# Patient Record
Sex: FEMALE | Race: BLACK OR AFRICAN AMERICAN | NOT HISPANIC OR LATINO | Employment: FULL TIME | URBAN - METROPOLITAN AREA
[De-identification: names, ages, dates, MRNs, and addresses within clinical notes are randomized per-mention and may not be internally consistent; named-entity substitution may affect disease eponyms.]

---

## 2023-10-31 PROBLEM — L74.510 PRIMARY FOCAL HYPERHIDROSIS, AXILLA: Status: ACTIVE | Noted: 2023-07-28

## 2023-10-31 PROBLEM — L91.0 KELOID: Status: ACTIVE | Noted: 2023-07-28

## 2023-10-31 PROBLEM — L42 PITYRIASIS ROSEA: Status: ACTIVE | Noted: 2023-07-28

## 2023-10-31 PROBLEM — R21 RASH AND OTHER NONSPECIFIC SKIN ERUPTION: Status: ACTIVE | Noted: 2023-07-28

## 2023-10-31 PROBLEM — L70.0 ACNE VULGARIS: Status: ACTIVE | Noted: 2023-07-28

## 2023-10-31 PROBLEM — L91.0 HYPERTROPHIC SCAR: Status: ACTIVE | Noted: 2023-07-28

## 2023-10-31 RX ORDER — MECLIZINE HYDROCHLORIDE 25 MG/1
25 TABLET ORAL 3 TIMES DAILY PRN
COMMUNITY
Start: 2014-10-18

## 2023-10-31 RX ORDER — ALUMINUM CHLORIDE 20 %
SOLUTION, NON-ORAL TOPICAL
COMMUNITY
Start: 2021-09-03

## 2023-10-31 RX ORDER — BENZOYL PEROXIDE 100 MG/ML
LIQUID TOPICAL
COMMUNITY
Start: 2022-05-12

## 2023-10-31 RX ORDER — TRIAMCINOLONE ACETONIDE 1 MG/G
CREAM TOPICAL
COMMUNITY
Start: 2017-10-02 | End: 2023-12-27 | Stop reason: WASHOUT

## 2023-10-31 RX ORDER — CLINDAMYCIN PHOSPHATE 10 UG/ML
LOTION TOPICAL
COMMUNITY
Start: 2020-11-11

## 2023-10-31 RX ORDER — TRETINOIN 0.5 MG/G
CREAM TOPICAL
COMMUNITY
Start: 2022-06-09 | End: 2023-12-27 | Stop reason: WASHOUT

## 2023-10-31 RX ORDER — TRETINOIN 0.25 MG/G
CREAM TOPICAL
COMMUNITY
Start: 2020-11-11 | End: 2023-12-27 | Stop reason: WASHOUT

## 2023-10-31 RX ORDER — SPIRONOLACTONE AND HYDROCHLOROTHIAZIDE 25; 25 MG/1; MG/1
TABLET ORAL
COMMUNITY
Start: 2020-11-11

## 2023-10-31 RX ORDER — HYDROQUINONE 40 MG/G
CREAM TOPICAL
COMMUNITY
Start: 2023-07-28 | End: 2023-12-27 | Stop reason: WASHOUT

## 2023-10-31 RX ORDER — DOXYCYCLINE HYCLATE 100 MG
TABLET ORAL
COMMUNITY
Start: 2023-04-28

## 2023-10-31 RX ORDER — HYDROQUINONE 40 MG/G
CREAM TOPICAL
COMMUNITY
Start: 2022-05-12 | End: 2023-12-27 | Stop reason: WASHOUT

## 2023-11-03 ENCOUNTER — OFFICE VISIT (OUTPATIENT)
Dept: DERMATOLOGY | Facility: CLINIC | Age: 28
End: 2023-11-03
Payer: MEDICAID

## 2023-11-03 DIAGNOSIS — L91.0 KELOID: Primary | ICD-10-CM

## 2023-11-03 ASSESSMENT — DERMATOLOGY QUALITY OF LIFE (QOL) ASSESSMENT
DATE THE QUALITY-OF-LIFE ASSESSMENT WAS COMPLETED: 66781
RATE HOW BOTHERED YOU ARE BY EFFECTS OF YOUR SKIN PROBLEMS ON YOUR ACTIVITIES (EG, GOING OUT, ACCOMPLISHING WHAT YOU WANT, WORK ACTIVITIES OR YOUR RELATIONSHIPS WITH OTHERS): 0 - NEVER BOTHERED
RATE HOW BOTHERED YOU ARE BY SYMPTOMS OF YOUR SKIN PROBLEM (EG, ITCHING, STINGING BURNING, HURTING OR SKIN IRRITATION): 0 - NEVER BOTHERED
RATE HOW EMOTIONALLY BOTHERED YOU ARE BY YOUR SKIN PROBLEM (FOR EXAMPLE, WORRY, EMBARRASSMENT, FRUSTRATION): 0 - NEVER BOTHERED
ARE THERE EXCLUSIONS OR EXCEPTIONS FOR THE QUALITY OF LIFE ASSESSMENT: NO

## 2023-11-03 ASSESSMENT — ITCH NUMERIC RATING SCALE: HOW SEVERE IS YOUR ITCHING?: 0

## 2023-11-03 ASSESSMENT — DERMATOLOGY PATIENT ASSESSMENT: DO YOU HAVE ANY NEW OR CHANGING LESIONS: NO

## 2023-11-03 NOTE — PROGRESS NOTES
Subjective     Danielle Wise is a 28 y.o. female who presents for the following: Keloid.     Established patient of Chika Diggs last seen September 2023 for keloid to left helix duration longstanding prior treatments IL K injection.  Patient previously injected with IL K 40 mg 0.2 mL.  Patient states she has seen improvement and would like to continue treatment today.      Review of Systems:  No other skin or systemic complaints other than what is documented elsewhere in the note.    The following portions of the chart were reviewed this encounter and updated as appropriate:       Skin Cancer History  No skin cancer on file.    Specialty Problems          Dermatology Problems    Acne vulgaris    Hypertrophic scar    Keloid    Pityriasis rosea    Primary focal hyperhidrosis, axilla    Rash and other nonspecific skin eruption     Past Medical History:  Danielle Wise  has no past medical history on file.    Past Surgical History:  Danielle Wise  has no past surgical history on file.    Family History:  Patient family history is not on file.    Social History:  Danielle Wise  has no history on file for tobacco use, alcohol use, and drug use.    Allergies:  Patient has no known allergies.    Current Medications / CAM's:    Current Outpatient Medications:     aluminum chloride (Drysol Dab-O-Matic) 20 % external solution, 0, Disp: , Rfl:     benzoyl peroxide (Benzac AC) 10 % external wash, 1 Application, Disp: , Rfl:     benzoyl peroxide 5 % lotion, 1 Application, Disp: , Rfl:     clindamycin (Cleocin T) 1 % lotion, 1 Application, Disp: , Rfl:     doxycycline (Vibra-Tabs) 100 mg tablet, 1 Tablet, Disp: , Rfl:     hydroquinone 4 % cream, 1 Application, Disp: , Rfl:     hydroquinone 4 % cream, 1 Application, Disp: , Rfl:     meclizine (Antivert) 25 mg tablet, Take 1 tablet (25 mg) by mouth 3 times a day as needed., Disp: , Rfl:     spironolacton-hydrochlorothiaz (Aldactazide) 25-25 mg tablet, 1 Tablet, Disp: , Rfl:      tretinoin (Retin-A) 0.025 % cream, 1 Application, Disp: , Rfl:     tretinoin (Retin-A) 0.05 % cream, 1 Application, Disp: , Rfl:     triamcinolone (Kenalog) 0.1 % cream, 1 Application, Disp: , Rfl:      Objective   Well appearing patient in no apparent distress; mood and affect are within normal limits.    Assessment/Plan   1. Keloid (2)  Left Ear (2)  Shiny, thick, fibrotic pink-brown plaque.    Intralesional injection - Left Ear (2)  Intralesional Injection: 0.2ml total injected  Date/Time: 11/3/2023 9:10 AM    Consent:     Consent obtained:  Verbal    Consent given by:  Patient    Risks discussed:  Poor cosmetic result, pain, infection and bleeding    Alternatives discussed:  No treatment  Pre-Procedure Details:     Prep Type:  Isopropyl alcohol  Procedure Details:   Injection:  Triamcinolone  Post-procedure details: sterile dressing applied and wound care instructions given  Dressing type: bandage

## 2023-12-13 ENCOUNTER — APPOINTMENT (OUTPATIENT)
Dept: OBSTETRICS AND GYNECOLOGY | Facility: CLINIC | Age: 28
End: 2023-12-13
Payer: MEDICAID

## 2023-12-27 ENCOUNTER — OFFICE VISIT (OUTPATIENT)
Dept: OBSTETRICS AND GYNECOLOGY | Facility: CLINIC | Age: 28
End: 2023-12-27
Payer: MEDICAID

## 2023-12-27 VITALS
BODY MASS INDEX: 30.13 KG/M2 | WEIGHT: 203.4 LBS | DIASTOLIC BLOOD PRESSURE: 84 MMHG | SYSTOLIC BLOOD PRESSURE: 129 MMHG | HEIGHT: 69 IN

## 2023-12-27 DIAGNOSIS — N94.10 DYSPAREUNIA IN FEMALE: ICD-10-CM

## 2023-12-27 DIAGNOSIS — Z01.419 ENCOUNTER FOR WELL WOMAN EXAM WITH ROUTINE GYNECOLOGICAL EXAM: Primary | ICD-10-CM

## 2023-12-27 PROCEDURE — 88175 CYTOPATH C/V AUTO FLUID REDO: CPT

## 2023-12-27 PROCEDURE — 1036F TOBACCO NON-USER: CPT | Performed by: OBSTETRICS & GYNECOLOGY

## 2023-12-27 PROCEDURE — 99213 OFFICE O/P EST LOW 20 MIN: CPT | Performed by: OBSTETRICS & GYNECOLOGY

## 2023-12-27 PROCEDURE — 99385 PREV VISIT NEW AGE 18-39: CPT | Performed by: OBSTETRICS & GYNECOLOGY

## 2023-12-27 ASSESSMENT — ENCOUNTER SYMPTOMS
CARDIOVASCULAR NEGATIVE: 0
CONSTITUTIONAL NEGATIVE: 0
GASTROINTESTINAL NEGATIVE: 0
HEMATOLOGIC/LYMPHATIC NEGATIVE: 0
MUSCULOSKELETAL NEGATIVE: 0
NEUROLOGICAL NEGATIVE: 0
PSYCHIATRIC NEGATIVE: 0
RESPIRATORY NEGATIVE: 0
ENDOCRINE NEGATIVE: 0
EYES NEGATIVE: 0
ALLERGIC/IMMUNOLOGIC NEGATIVE: 0

## 2023-12-27 ASSESSMENT — PATIENT HEALTH QUESTIONNAIRE - PHQ9
1. LITTLE INTEREST OR PLEASURE IN DOING THINGS: SEVERAL DAYS
SUM OF ALL RESPONSES TO PHQ9 QUESTIONS 1 AND 2: 1
10. IF YOU CHECKED OFF ANY PROBLEMS, HOW DIFFICULT HAVE THESE PROBLEMS MADE IT FOR YOU TO DO YOUR WORK, TAKE CARE OF THINGS AT HOME, OR GET ALONG WITH OTHER PEOPLE: SOMEWHAT DIFFICULT
2. FEELING DOWN, DEPRESSED OR HOPELESS: NOT AT ALL

## 2023-12-27 ASSESSMENT — PAIN SCALES - GENERAL: PAINLEVEL: 0-NO PAIN

## 2023-12-27 NOTE — PROGRESS NOTES
"History Of Present Illness  Routine Gyn Exam  Danielle Wise here for routine WWE.     Current complaints: . She reports painful intercourse. After her delivery she had \"two sets of stiches.\"  The pain can be variable-- at times the pain is with deeper penetration but can say that \"the pain is everywhere.\" She can have pain in her abdomen as well with activities like walking up the steps.  She reports that intercourse is never pain free.  Pain can be both with entry and deeper penetration.  She does use a lubricant to help with dryness. She also reports that, although she can achieve an orgasm independently, \"it is not the same\" and that she rarely can achieve an orgasm with her partner. Her desire/arousal is unchanged.     Gynecologic History  LMP 2023  Contraception: IUD-- placed   Last Pap: unsure. Results were: normal  Last mammogram: NA. Results were: NA  Last Colonoscopy:  NA. Results were: NA  Last DEXA: NA. Results were: NA  Lipid/DM: NA    Obstetric History  OB History    Para Term  AB Living   3 1           SAB IAB Ectopic Multiple Live Births                  # Outcome Date GA Lbr Luciano/2nd Weight Sex Delivery Anes PTL Lv   3             2             1 Para                 Past Medical History  She has no past medical history on file.    Surgical History  She has no past surgical history on file.     Social History  She reports that she has never smoked. She has never used smokeless tobacco. She reports that she does not currently use alcohol. She reports that she does not use drugs.    Family History  No family history on file.     Allergies  Latex     Physical Exam  Constitutional:       Appearance: Normal appearance.   Pulmonary:      Effort: Pulmonary effort is normal.   Abdominal:      General: Abdomen is flat. There is no distension.      Palpations: Abdomen is soft.      Tenderness: There is no abdominal tenderness.   Genitourinary:     Labia:         Right: No " "rash, tenderness or lesion.         Left: No rash, tenderness or lesion.       Urethra: No prolapse.      Vagina: Normal. No vaginal discharge or bleeding.      Cervix: No friability or lesion.      Uterus: Normal. Not enlarged and not tender.       Adnexa:         Right: No mass, tenderness or fullness.          Left: No mass, tenderness or fullness.        Comments: IUD strings present  Neurological:      General: No focal deficit present.      Mental Status: She is alert.   Psychiatric:         Mood and Affect: Mood normal.          Last Recorded Vitals  Blood pressure 129/84, height 1.753 m (5' 9\"), weight 92.3 kg (203 lb 6.4 oz).    Assessment/Plan   Problem List Items Addressed This Visit    None  Visit Diagnoses       Encounter for well woman exam with routine gynecological exam    -  Primary    Relevant Orders    THINPREP PAP TEST    Dyspareunia in female        Relevant Orders    US PELVIS TRANSABDOMINAL WITH TRANSVAGINAL            Contraception: Paragard  PAP: obtained   Mammogram: NA   Colonoscopy:  NA   DEXA: NA   Lipid/DM:  NA  We spent an additional 20 minutes reviewing her sexuality-based complaints of dyspareunia and difficulty achieving orgasm.  Will obtain ultrasound. She does not seem to have significant pelvic floor dysfunction on exam-- discussed possible pelvic floor PT and/or Behavioral Health referral pending ultrasound results.     Ari Valero MD  "

## 2024-01-05 ENCOUNTER — HOSPITAL ENCOUNTER (OUTPATIENT)
Dept: RADIOLOGY | Facility: HOSPITAL | Age: 29
Discharge: HOME | End: 2024-01-05
Payer: MEDICAID

## 2024-01-05 DIAGNOSIS — N94.10 DYSPAREUNIA IN FEMALE: ICD-10-CM

## 2024-01-05 PROCEDURE — 76856 US EXAM PELVIC COMPLETE: CPT | Performed by: RADIOLOGY

## 2024-01-05 PROCEDURE — 76830 TRANSVAGINAL US NON-OB: CPT

## 2024-01-05 PROCEDURE — 76830 TRANSVAGINAL US NON-OB: CPT | Performed by: RADIOLOGY

## 2024-01-08 DIAGNOSIS — N94.10 DYSPAREUNIA IN FEMALE: Primary | ICD-10-CM

## 2024-01-12 LAB
CYTOLOGY CMNT CVX/VAG CYTO-IMP: NORMAL
LAB AP HPV GENOTYPE QUESTION: YES
LAB AP HPV HR: NORMAL
LABORATORY COMMENT REPORT: NORMAL
PATH REPORT.TOTAL CANCER: NORMAL

## 2024-01-26 ENCOUNTER — APPOINTMENT (OUTPATIENT)
Dept: DERMATOLOGY | Facility: CLINIC | Age: 29
End: 2024-01-26
Payer: MEDICAID

## 2024-02-02 ENCOUNTER — APPOINTMENT (OUTPATIENT)
Dept: DERMATOLOGY | Facility: CLINIC | Age: 29
End: 2024-02-02
Payer: MEDICAID

## 2024-04-26 ENCOUNTER — LAB (OUTPATIENT)
Dept: LAB | Facility: LAB | Age: 29
End: 2024-04-26
Payer: MEDICAID

## 2024-04-26 ENCOUNTER — OFFICE VISIT (OUTPATIENT)
Dept: DERMATOLOGY | Facility: CLINIC | Age: 29
End: 2024-04-26
Payer: MEDICAID

## 2024-04-26 DIAGNOSIS — L73.0 ACNE KELOID: Primary | ICD-10-CM

## 2024-04-26 DIAGNOSIS — L70.0 ACNE VULGARIS: ICD-10-CM

## 2024-04-26 LAB
ALBUMIN SERPL BCP-MCNC: 4.2 G/DL (ref 3.4–5)
ALP SERPL-CCNC: 53 U/L (ref 33–110)
ALT SERPL W P-5'-P-CCNC: 18 U/L (ref 7–45)
ANION GAP SERPL CALC-SCNC: 11 MMOL/L (ref 10–20)
AST SERPL W P-5'-P-CCNC: 14 U/L (ref 9–39)
B-HCG SERPL-ACNC: <3 MIU/ML
BILIRUB SERPL-MCNC: 0.5 MG/DL (ref 0–1.2)
BUN SERPL-MCNC: 13 MG/DL (ref 6–23)
CALCIUM SERPL-MCNC: 9.2 MG/DL (ref 8.6–10.6)
CHLORIDE SERPL-SCNC: 103 MMOL/L (ref 98–107)
CHOLEST SERPL-MCNC: 182 MG/DL (ref 0–199)
CHOLESTEROL/HDL RATIO: 3.3
CO2 SERPL-SCNC: 28 MMOL/L (ref 21–32)
CREAT SERPL-MCNC: 0.69 MG/DL (ref 0.5–1.05)
EGFRCR SERPLBLD CKD-EPI 2021: >90 ML/MIN/1.73M*2
GLUCOSE SERPL-MCNC: 85 MG/DL (ref 74–99)
HDLC SERPL-MCNC: 55.3 MG/DL
LDLC SERPL CALC-MCNC: 115 MG/DL
NON HDL CHOLESTEROL: 127 MG/DL (ref 0–149)
POTASSIUM SERPL-SCNC: 3.9 MMOL/L (ref 3.5–5.3)
PROT SERPL-MCNC: 6.9 G/DL (ref 6.4–8.2)
SODIUM SERPL-SCNC: 138 MMOL/L (ref 136–145)
TRIGL SERPL-MCNC: 57 MG/DL (ref 0–149)
VLDL: 11 MG/DL (ref 0–40)

## 2024-04-26 PROCEDURE — 80053 COMPREHEN METABOLIC PANEL: CPT

## 2024-04-26 PROCEDURE — 80061 LIPID PANEL: CPT

## 2024-04-26 PROCEDURE — 11900 INJECT SKIN LESIONS </W 7: CPT | Performed by: NURSE PRACTITIONER

## 2024-04-26 PROCEDURE — 1036F TOBACCO NON-USER: CPT | Performed by: NURSE PRACTITIONER

## 2024-04-26 PROCEDURE — 84702 CHORIONIC GONADOTROPIN TEST: CPT

## 2024-04-26 PROCEDURE — 99214 OFFICE O/P EST MOD 30 MIN: CPT | Performed by: NURSE PRACTITIONER

## 2024-04-26 PROCEDURE — 36415 COLL VENOUS BLD VENIPUNCTURE: CPT

## 2024-04-26 RX ORDER — TRIAMCINOLONE ACETONIDE 40 MG/ML
40 INJECTION, SUSPENSION INTRA-ARTICULAR; INTRAMUSCULAR ONCE
Status: COMPLETED | OUTPATIENT
Start: 2024-04-26 | End: 2024-04-26

## 2024-04-26 RX ADMIN — TRIAMCINOLONE ACETONIDE 40 MG: 40 INJECTION, SUSPENSION INTRA-ARTICULAR; INTRAMUSCULAR at 09:47

## 2024-04-26 NOTE — PROGRESS NOTES
Subjective     Danielle Wise is a 28 y.o. female who presents for the following: Keloid.     Follow up for keloids duration longstanding prior treatments ILK injection.      Review of Systems:  No other skin or systemic complaints other than what is documented elsewhere in the note.    The following portions of the chart were reviewed this encounter and updated as appropriate:       Skin Cancer History  No skin cancer on file.    Specialty Problems          Dermatology Problems    Acne vulgaris    Hypertrophic scar    Keloid    Pityriasis rosea    Primary focal hyperhidrosis, axilla    Rash and other nonspecific skin eruption     Past Medical History:  Danielle Wise  has no past medical history on file.    Past Surgical History:  Danielle Wise  has a past surgical history that includes Urethra surgery (2005).    Family History:  Patient family history is not on file.    Social History:  Danielle Wise  reports that she has never smoked. She has never used smokeless tobacco. She reports that she does not currently use alcohol. She reports that she does not use drugs.    Allergies:  Latex    Current Medications / CAM's:    Current Outpatient Medications:     aluminum chloride (Drysol Dab-O-Matic) 20 % external solution, 0, Disp: , Rfl:     benzoyl peroxide (Benzac AC) 10 % external wash, 1 Application, Disp: , Rfl:     benzoyl peroxide 5 % lotion, 1 Application, Disp: , Rfl:     clindamycin (Cleocin T) 1 % lotion, 1 Application, Disp: , Rfl:     doxycycline (Vibra-Tabs) 100 mg tablet, 1 Tablet, Disp: , Rfl:     meclizine (Antivert) 25 mg tablet, Take 1 tablet (25 mg) by mouth 3 times a day as needed., Disp: , Rfl:     spironolacton-hydrochlorothiaz (Aldactazide) 25-25 mg tablet, 1 Tablet, Disp: , Rfl:   No current facility-administered medications for this visit.     Objective   Well appearing patient in no apparent distress; mood and affect are within normal limits.    Assessment/Plan   1. Acne keloid  (2)  Left Shoulder - Posterior; Left Upper Back    Thickend scars are dense, fibrous tissue nodules typically found at areas of previously traumatized skin (eg, burns, lacerations, incision scars), or arising spontaneously on normal skin. Lesions may be single or multiple.     They can be especially hard to treat. They often come back after treatment. Giving more than one treatment at a time, such as a shot of medicine after surgery, can work better than just one treatment.      I okay tolerated well to 0.4 mL of Kenalog 40 mg/mL    Related Medications  triamcinolone acetonide (Kenalog-40) injection 40 mg      2. Acne vulgaris  Left Shoulder - Posterior, Left Upper Back, Mid Back, Right Shoulder - Posterior, Right Upper Back  Scattered comedones and inflammatory papulopustules with evidence of significant postinflammatory hyperpigmentation and keloids.  Patient is currently using benzyl peroxide and clindamycin and has previously attempted use of oral antibiotics without long-term improvement    -Given recalcitrant nature of acne, the patient wishes to initiate/continues on course of Rx Accutane/Isotretinoin.   -Previous labs reviewed  -Will check CBC, CMP, Lipid panel and B-HCG prior to monthly prescription dispensing  -Potential side effects reviewed with the patient today including teratogenicity and birth defects (and the need for 2 forms of contraception), lipid abnormalities (hyperTGL which may result in pancreatitis), liver or kidney failure, risk of depression or suicide, risk of potential inflammatory bowel disease  -Common and expected side effects include xerosis (dryness) of the lips and skin, nose bleeds due to dryness of the nasal mucosa, and redness/rash of the skin (retinoid dermatitis). -Recommend liberal emollients (Vaseline to the lips, Aveeno Eczema Therapy to the body) to be used daily.  -Discussed with the patient that they should not get pregnant while on therapy, do not donate blood, and do  not give their medication to anyone. The patient verbalizes understanding and agreement.  -For patients of child bearing potential, discussed the need for monthly pregnancy tests within a window determined by the iPledge program to continue medication. Monthly questions must be answered by the patient in the iPledge system prior to being able to  their prescription.  -Signed iPledge consent form is on file    -If labs permissible, plan to continue rx Accutane/Isotretinoin as tolerated until a cumulative dose of 150-220 mg/kg is obtained.   -Patient is to take the medication with a fatty food/meal to aid in absorption of the medication (if not taking branded Absorica)    Method of Birth Control: IUD and condoms  Labs ordered today will be repeated in 30 days patient is aware she needs monthly visits every 30 days while on this medication    Return to clinic in 30 days      Related Procedures  Comprehensive metabolic panel  Lipid panel  HUMAN CHORIONIC GONADOTROPIN, SERUM QUANTITATIVE

## 2024-05-29 ENCOUNTER — TELEMEDICINE (OUTPATIENT)
Dept: DERMATOLOGY | Facility: CLINIC | Age: 29
End: 2024-05-29
Payer: MEDICAID

## 2024-05-29 DIAGNOSIS — L70.0 ACNE VULGARIS: Primary | ICD-10-CM

## 2024-05-29 PROCEDURE — 99213 OFFICE O/P EST LOW 20 MIN: CPT | Performed by: NURSE PRACTITIONER

## 2024-05-29 NOTE — PROGRESS NOTES
Subjective     Danielle Wise is a 28 y.o. female who presents for the following: Acne isotretinoin follow-up.   Established patient in virtually today to start on Accutane. Registered after last visit.     Review of Systems:  No other skin or systemic complaints other than what is documented elsewhere in the note.    The following portions of the chart were reviewed this encounter and updated as appropriate:       Skin Cancer History  No skin cancer on file.    Specialty Problems          Dermatology Problems    Acne vulgaris    Hypertrophic scar    Keloid    Pityriasis rosea    Primary focal hyperhidrosis, axilla    Rash and other nonspecific skin eruption     Past Medical History:  Danielle Wise  has no past medical history on file.    Past Surgical History:  Danielle Wise  has a past surgical history that includes Urethra surgery (2005).    Family History:  Patient family history is not on file.    Social History:  Danielle Wise  reports that she has never smoked. She has never used smokeless tobacco. She reports that she does not currently use alcohol. She reports that she does not use drugs.    Allergies:  Latex    Current Medications / CAM's:    Current Outpatient Medications:     aluminum chloride (Drysol Dab-O-Matic) 20 % external solution, 0, Disp: , Rfl:     benzoyl peroxide (Benzac AC) 10 % external wash, 1 Application, Disp: , Rfl:     benzoyl peroxide 5 % lotion, 1 Application, Disp: , Rfl:     clindamycin (Cleocin T) 1 % lotion, 1 Application, Disp: , Rfl:     doxycycline (Vibra-Tabs) 100 mg tablet, 1 Tablet, Disp: , Rfl:     meclizine (Antivert) 25 mg tablet, Take 1 tablet (25 mg) by mouth 3 times a day as needed., Disp: , Rfl:     spironolacton-hydrochlorothiaz (Aldactazide) 25-25 mg tablet, 1 Tablet, Disp: , Rfl:      Objective   Well appearing patient in no apparent distress; mood and affect are within normal limits.      Assessment/Plan   1. Acne vulgaris  Head - Anterior (Face)  Scattered  comedones and inflammatory papulopustules.    -Given recalcitrant nature of acne, the patient wishes to initiate/continues on course of Rx Accutane/Isotretinoin.   -Previous labs reviewed  -Will check CBC, CMP, Lipid panel and B-HCG prior to monthly prescription dispensing  -Potential side effects reviewed with the patient today including teratogenicity and birth defects (and the need for 2 forms of contraception), lipid abnormalities (hyperTGL which may result in pancreatitis), liver or kidney failure, risk of depression or suicide, risk of potential inflammatory bowel disease  -Common and expected side effects include xerosis (dryness) of the lips and skin, nose bleeds due to dryness of the nasal mucosa, and redness/rash of the skin (retinoid dermatitis). -Recommend liberal emollients (Vaseline to the lips, Aveeno Eczema Therapy to the body) to be used daily.  -Discussed with the patient that they should not get pregnant while on therapy, do not donate blood, and do not give their medication to anyone. The patient verbalizes understanding and agreement.  -For patients of child bearing potential, discussed the need for monthly pregnancy tests within a window determined by the iPledge program to continue medication. Monthly questions must be answered by the patient in the iPledge system prior to being able to  their prescription.  -Signed iPledge consent form is on file    -If labs permissible, plan to continue rx Accutane/Isotretinoin as tolerated until a cumulative dose of 150-220 mg/kg is obtained.   -Patient is to take the medication with a fatty food/meal to aid in absorption of the medication (if not taking branded Absorica)    -Pt weight: 212 lb  -Current cumulative dose: 0mg  Method of Birth Control: Copper IUD and condoms    Return to clinic in 30 days      Related Procedures  Follow Up In Dermatology - Established Patient

## 2024-08-16 ENCOUNTER — OFFICE VISIT (OUTPATIENT)
Dept: OBSTETRICS AND GYNECOLOGY | Facility: CLINIC | Age: 29
End: 2024-08-16
Payer: MEDICAID

## 2024-08-16 VITALS
HEIGHT: 69 IN | BODY MASS INDEX: 32.62 KG/M2 | HEART RATE: 99 BPM | DIASTOLIC BLOOD PRESSURE: 81 MMHG | SYSTOLIC BLOOD PRESSURE: 119 MMHG | WEIGHT: 220.25 LBS

## 2024-08-16 DIAGNOSIS — Z01.419 ENCOUNTER FOR ANNUAL ROUTINE GYNECOLOGICAL EXAMINATION: Primary | ICD-10-CM

## 2024-08-16 DIAGNOSIS — R03.0 ELEVATED BLOOD PRESSURE READING: ICD-10-CM

## 2024-08-16 PROCEDURE — 99395 PREV VISIT EST AGE 18-39: CPT | Mod: GC

## 2024-08-16 PROCEDURE — 3008F BODY MASS INDEX DOCD: CPT

## 2024-08-16 PROCEDURE — 1036F TOBACCO NON-USER: CPT

## 2024-08-16 PROCEDURE — 99395 PREV VISIT EST AGE 18-39: CPT

## 2024-08-16 RX ORDER — ASPIRIN 325 MG
1 TABLET, DELAYED RELEASE (ENTERIC COATED) ORAL
COMMUNITY
Start: 2023-08-22

## 2024-08-16 ASSESSMENT — PAIN SCALES - GENERAL: PAINLEVEL: 0-NO PAIN

## 2024-08-16 NOTE — PROGRESS NOTES
Chief complaint:  elevated Blood pressures    HPI    Patient reports elevated blood pressures whenever she checks her blood pressures. Typically in the setting of doctor's offices but doesn't endorse any stress associated with doctors visits. Cooks for herself at home, diet consists of things like chicken and broccoli and does not endorse using a lot of salt when cooking. States she is active; biking, walking, hiking, has an active job. Has a treadmill at home. Getting exercise everyday. Had a previous ankle injury that prohibited activity for 12 weeks.     Gynecologic History:    Menarche: 13  Menses: Every 30 days for 5 days with heavy flow (copper IUD in place). No dysmenorrhea.   Last Pap: 2023; normal/neg  HPV Vax: Yes  History of Dysplasia: None  STI history: Gonorrhea in distant past, appropriately treated  Sexually active: No, Last sexual intercourse was a month ago. Sex with men. 3 lifetime partners.  Contraception: Copper IUD  Hygiene: Soap on outside of vulva with water    OB History:   OB History          3    Para   1    Term   1            AB   2    Living   1         SAB        IAB   2    Ectopic        Multiple        Live Births   1               2 elective abortions  1 daughter 10 years old; vaginal delivery.     Medical History:   No past medical history on file.     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   Exercise: hiking, biking, walking, treadmill  Diet: Chicken and veggies, low salt.   ETOH: None  Tobacco: None  Illicit Drug use: None currently, marijuana in the past associated with anxiety, LSD in the past associated with anxiety/tachycardia; doesn't like the feeling.  Safety: Yes. Lives with daughter.     Family History:  Maternal grandfather: unsure of type  Maternal uncle: unsure of type  History of  hypertension: mother and maternal extended family as well as extended paternal family    ROS:  12 point review of systems was negative except as stated in the HPI.    Objective:  Vitals:    24 1131   BP: 119/81   Pulse: 99       Physical Exam:  General: Well appearing, alert  HEENT: normocephalic, EOMI, clear sclera  Cardio: Warm and well perfused  Resp: breathing comfortably on room air  Abd: soft, nontender, nondistended  : deferred given recent annual exam in December   Neuro: grossly intact, no focal deficits  Extremities: full ROM, no calf tenderness  Psych: A&O x3, appropriate mood and affect        Assessment/Plan:    Danielle Wise is a 28 y.o.  who presents for annual gyn exam.      .  Problem List Items Addressed This Visit             ICD-10-CM    Elevated blood pressure reading R03.0     - Primary care referral  - Discussed lifestyle changes and exercise.         Relevant Orders    Referral to Primary Care    Encounter for annual routine gynecological examination - Primary Z01.419     - Referral to primary care for management of elevated blood pressure readings  - Return in 1 year for annual gyn visit.         Relevant Orders    Follow Up In Gynecology     RTC in 1 year for annual exam     Seen and discussed with Dr. James and Dr. Aida Padilla, MS3    I saw the patient with the medical student and made edits within the text     Linda Parra MD

## 2024-08-16 NOTE — ASSESSMENT & PLAN NOTE
- Referral to primary care for management of elevated blood pressure readings  - Return in 1 year for annual gyn visit.

## 2024-08-16 NOTE — PROGRESS NOTES
Chief complaint:  elevated Blood pressures    Assessment/Plan:    Danielle Wise is a 28 y.o.  who presents for annual gyn exam.      Problem List Items Addressed This Visit       Elevated blood pressure reading    Overview     - Elevated blood pressures at recent doctors visits with correction after initial elevated reading at today's visit  - Family history of hypertension  - Endorses low sodium diet with active lifestyle         Current Assessment & Plan     - Primary care referral  - Discussed lifestyle changes and exercise.         Encounter for annual routine gynecological examination - Primary    Overview     - Currently has Copper IUD in place  - Last pap 2023 normal/neg         Current Assessment & Plan     - Referral to primary care for management of elevated blood pressure readings  - Return in 1 year for annual gyn visit.              Seen and discussed with Dr. James and Dr. Aida POWERS      -------------------------------------  HPI    Elevated blood pressures whenever she checks her blood pressures. Typically in the setting of doctor's offices but doesn't endorse any stress associated with doctors visits. Cooks for herself at home, diet consists of things like chicken and broccoli and does not endorse using a lot of salt when cooking. States she is active; biking, walking, hiking, has an active job. Has a treadmill at home. Getting exercise everyday. Had a previous ankle injury that prohibited activity for 12 weeks.     Gynecologic History:    Menarche: 13  Menses: Every 30 days for 5 days with heavy flow (copper IUD in place). No dysmenorrhea.   Last Pap: 2023; normal/neg  HPV Vax: Yes  History of Dysplasia: None  STI history: Gonorrhea in distant past, appropriately treated  Sexually active: No, Last sexual intercourse was a month ago. Sex with men. 3 lifetime partners.  Contraception: Copper IUD  Hygiene: Soap on outside of vulva with water    OB History:   OB History           3    Para   1    Term   1            AB   2    Living   1         SAB        IAB   2    Ectopic        Multiple        Live Births   1               2 elective abortions  1 daughter 10 years old; vaginal delivery.     Medical History:   No past medical history on file.     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   Exercise: hiking, biking, walking, treadmill  Diet: Chicken and veggies, low salt. Discussed DASH diet.   ETOH: None  Tobacco: None  Elicits: None currently, marijuana in the past associated with anxiety, LSD in the past associated with anxiety/tachycardia; doesn't like the feeling.  Safety: Yes. Lives with daughter.     Family History:  Maternal grandfather: unsure of type  Maternal uncle: unsure of type  History of hypertension: mother and maternal extended family as well as extended paternal family    ROS:  12 point review of systems was negative except as stated in the HPI.    Objective:  Vitals:    24 1131   BP: 119/81   Pulse: 99

## 2024-08-18 NOTE — PROGRESS NOTES
I saw and evaluated the patient. I personally obtained the key and critical portions of the history and physical exam or was physically present for key and critical portions performed by the resident/fellow. I reviewed the resident/fellow's documentation and discussed the patient with the resident/fellow. I agree with the resident/fellow's medical decision making as documented in the note.    Emiliana James MD

## 2024-09-26 ENCOUNTER — LAB (OUTPATIENT)
Dept: LAB | Facility: LAB | Age: 29
End: 2024-09-26
Payer: MEDICAID

## 2024-09-26 ENCOUNTER — OFFICE VISIT (OUTPATIENT)
Dept: OBSTETRICS AND GYNECOLOGY | Facility: CLINIC | Age: 29
End: 2024-09-26
Payer: MEDICAID

## 2024-09-26 VITALS
SYSTOLIC BLOOD PRESSURE: 134 MMHG | HEIGHT: 69 IN | WEIGHT: 216 LBS | BODY MASS INDEX: 31.99 KG/M2 | DIASTOLIC BLOOD PRESSURE: 82 MMHG

## 2024-09-26 DIAGNOSIS — Z11.3 SCREEN FOR STD (SEXUALLY TRANSMITTED DISEASE): ICD-10-CM

## 2024-09-26 DIAGNOSIS — R10.2 PELVIC PAIN: ICD-10-CM

## 2024-09-26 DIAGNOSIS — R10.9 ABDOMINAL PAIN, UNSPECIFIED ABDOMINAL LOCATION: Primary | ICD-10-CM

## 2024-09-26 PROBLEM — M22.2X2 PATELLOFEMORAL ARTHRALGIA OF BOTH KNEES: Status: ACTIVE | Noted: 2022-10-21

## 2024-09-26 PROBLEM — F43.20 ADJUSTMENT DISORDER: Status: ACTIVE | Noted: 2021-12-03

## 2024-09-26 PROBLEM — M22.2X1 PATELLOFEMORAL ARTHRALGIA OF BOTH KNEES: Status: ACTIVE | Noted: 2022-10-21

## 2024-09-26 PROBLEM — F33.1 MAJOR DEPRESSIVE DISORDER, RECURRENT, MODERATE: Chronic | Status: ACTIVE | Noted: 2021-06-29

## 2024-09-26 LAB
HBV SURFACE AG SERPL QL IA: NONREACTIVE
HCV AB SER QL: NONREACTIVE
HIV 1+2 AB+HIV1 P24 AG SERPL QL IA: NONREACTIVE
PREGNANCY TEST URINE, POC: NEGATIVE

## 2024-09-26 PROCEDURE — 36415 COLL VENOUS BLD VENIPUNCTURE: CPT

## 2024-09-26 PROCEDURE — 86803 HEPATITIS C AB TEST: CPT

## 2024-09-26 PROCEDURE — 99214 OFFICE O/P EST MOD 30 MIN: CPT | Performed by: OBSTETRICS & GYNECOLOGY

## 2024-09-26 PROCEDURE — 87389 HIV-1 AG W/HIV-1&-2 AB AG IA: CPT

## 2024-09-26 PROCEDURE — 87591 N.GONORRHOEAE DNA AMP PROB: CPT

## 2024-09-26 PROCEDURE — 1036F TOBACCO NON-USER: CPT | Performed by: OBSTETRICS & GYNECOLOGY

## 2024-09-26 PROCEDURE — 87491 CHLMYD TRACH DNA AMP PROBE: CPT

## 2024-09-26 PROCEDURE — 87661 TRICHOMONAS VAGINALIS AMPLIF: CPT

## 2024-09-26 PROCEDURE — 87340 HEPATITIS B SURFACE AG IA: CPT

## 2024-09-26 PROCEDURE — 3008F BODY MASS INDEX DOCD: CPT | Performed by: OBSTETRICS & GYNECOLOGY

## 2024-09-26 PROCEDURE — 86780 TREPONEMA PALLIDUM: CPT

## 2024-09-26 PROCEDURE — 81025 URINE PREGNANCY TEST: CPT | Performed by: OBSTETRICS & GYNECOLOGY

## 2024-09-26 NOTE — PROGRESS NOTES
"Subjective   Patient ID: Danielle Wise \"Roc" is a 29 y.o. female who presents for Pelvic Pain and Breast Pain (Patient states that she has had breast tenderness for about a week and abdominal pain).  Has a copper IUD, concerned about pregnancy. Experiencing breast tenderness and abdominal pain for about a week.   Having breast tenderness, abdominal discomfort in LLQ when stands for prolonged periods of time. Pain is deep, pressure sensation, sharp at times.     Review of Systems   All other systems reviewed and are negative.    Objective   Physical Exam  Constitutional:       Appearance: Normal appearance.   HENT:      Head: Normocephalic and atraumatic.   Abdominal:      General: Abdomen is flat.      Palpations: Abdomen is soft.   Genitourinary:     General: Normal vulva.      Exam position: Lithotomy position.      Vagina: Normal.      Cervix: Normal.      Adnexa: Right adnexa normal and left adnexa normal.      Comments: Uterus non-tender, small fibroid on left  Musculoskeletal:         General: Normal range of motion.   Skin:     General: Skin is warm and dry.   Neurological:      General: No focal deficit present.      Mental Status: She is alert and oriented to person, place, and time.   Psychiatric:         Mood and Affect: Mood normal.         Behavior: Behavior normal.     Assessment/Plan   Problem List Items Addressed This Visit    None  Visit Diagnoses         Codes    Abdominal pain, unspecified abdominal location    -  Primary R10.9    Relevant Orders    POCT pregnancy, urine manually resulted (Completed)    Screen for STD (sexually transmitted disease)     Z11.3    Relevant Orders    C. trachomatis / N. gonorrhoeae, Amplified    Trichomonas vaginalis, Amplified    Hepatitis BsAg    Hepatitis C Antibody    HIV 1/2 Antigen/Antibody Screen with Reflex to Confirmation    Syphilis Screen with Reflex    Pelvic pain     R10.2        Jennifer Spaulding MD 09/26/24 2:02 PM   "

## 2024-09-27 ENCOUNTER — APPOINTMENT (OUTPATIENT)
Dept: OBSTETRICS AND GYNECOLOGY | Facility: CLINIC | Age: 29
End: 2024-09-27
Payer: MEDICAID

## 2024-09-27 LAB
C TRACH RRNA SPEC QL NAA+PROBE: NEGATIVE
N GONORRHOEA DNA SPEC QL PROBE+SIG AMP: NEGATIVE
T VAGINALIS RRNA SPEC QL NAA+PROBE: NEGATIVE
TREPONEMA PALLIDUM IGG+IGM AB [PRESENCE] IN SERUM OR PLASMA BY IMMUNOASSAY: NONREACTIVE

## 2024-10-04 ENCOUNTER — HOSPITAL ENCOUNTER (OUTPATIENT)
Dept: RADIOLOGY | Facility: CLINIC | Age: 29
Discharge: HOME | End: 2024-10-04
Payer: MEDICAID

## 2024-10-04 DIAGNOSIS — R10.2 PELVIC PAIN: ICD-10-CM

## 2024-10-04 PROCEDURE — 76856 US EXAM PELVIC COMPLETE: CPT

## 2024-10-08 ENCOUNTER — TELEPHONE (OUTPATIENT)
Dept: OBSTETRICS AND GYNECOLOGY | Facility: CLINIC | Age: 29
End: 2024-10-08
Payer: MEDICAID

## 2024-10-08 NOTE — TELEPHONE ENCOUNTER
----- Message from Nurse Rizwana CAPPS sent at 10/7/2024  8:19 AM EDT -----    ----- Message -----  From: Jennifer Spaulding MD  Sent: 10/6/2024  11:24 AM EDT  To: [facility] [address] [facility] Clinical Support Staff    IUD is appropriately placed.

## 2024-10-08 NOTE — TELEPHONE ENCOUNTER
Contacted pt  Name and  verified  Pt is aware IUD is in proper place   Pt verbalized understanding.  No further questions or concerns at this time.

## 2025-02-05 ENCOUNTER — OFFICE VISIT (OUTPATIENT)
Dept: DERMATOLOGY | Facility: CLINIC | Age: 30
End: 2025-02-05
Payer: MEDICAID

## 2025-02-05 DIAGNOSIS — L91.0 KELOID: Primary | ICD-10-CM

## 2025-02-05 DIAGNOSIS — L70.0 ACNE VULGARIS: ICD-10-CM

## 2025-02-05 PROCEDURE — 11900 INJECT SKIN LESIONS </W 7: CPT | Performed by: DERMATOLOGY

## 2025-02-05 PROCEDURE — 99214 OFFICE O/P EST MOD 30 MIN: CPT | Performed by: DERMATOLOGY

## 2025-02-05 PROCEDURE — 1036F TOBACCO NON-USER: CPT | Performed by: DERMATOLOGY

## 2025-02-05 RX ORDER — TRIAMCINOLONE ACETONIDE 40 MG/ML
24 INJECTION, SUSPENSION INTRA-ARTICULAR; INTRAMUSCULAR ONCE
Status: COMPLETED | OUTPATIENT
Start: 2025-02-05 | End: 2025-02-05

## 2025-02-05 RX ORDER — BENZOYL PEROXIDE 100 MG/ML
LIQUID TOPICAL
Qty: 227 G | Refills: 11 | Status: SHIPPED | OUTPATIENT
Start: 2025-02-05

## 2025-02-05 RX ORDER — DOXYCYCLINE 100 MG/1
CAPSULE ORAL
Qty: 60 CAPSULE | Refills: 2 | Status: SHIPPED | OUTPATIENT
Start: 2025-02-05

## 2025-02-05 RX ORDER — CLINDAMYCIN PHOSPHATE 10 UG/ML
LOTION TOPICAL
Qty: 60 ML | Refills: 11 | Status: SHIPPED | OUTPATIENT
Start: 2025-02-05

## 2025-02-05 RX ADMIN — TRIAMCINOLONE ACETONIDE 24 MG: 40 INJECTION, SUSPENSION INTRA-ARTICULAR; INTRAMUSCULAR at 15:11

## 2025-02-05 NOTE — PROGRESS NOTES
"Subjective     Danielle Wise \"Bebe\" is a 29 y.o. female who presents for the following: Keloid (Left upper back/ shoulder. Pt states pain at site and increase in size. Pt states treatment with injections. Pt denies history of skin cancer. ).     Review of Systems:  No other skin or systemic complaints other than what is documented elsewhere in the note.    The following portions of the chart were reviewed this encounter and updated as appropriate:   Tobacco  Allergies  Meds  Problems  Med Hx  Surg Hx  Fam Hx                Objective   Well appearing patient in no apparent distress; mood and affect are within normal limits.    A focused skin examination was performed. All findings within normal limits unless otherwise noted below.    Assessment/Plan   1. Keloid  Left Upper Back  Fibrotic nodular plaque    -Discussed nature of condition  -Discussed treatment options  -After discussion, patient wishes to proceed with intralesional kenalog injections.    -After history, physical examination and discussion, a decision was made to proceed with intralesional kenalog therapy today  -Risks, benefits and alternatives of intralesional steroids was discussed with patient including the risk of atrophy, striae, telangiectasia, and pigmentary changes. Patient expresses understanding of these risks and verbal consent was obtained from the patient to treat with intralesional Kenalog.      Intralesional injection - Left Upper Back  Intralesional Injection:   Consent:     Consent obtained:  Verbal    Consent given by:  Patient    Risks discussed:  Poor cosmetic result, pain, infection and bleeding    Alternatives discussed:  No treatment  Pre-Procedure Details:     Prep Type:  Isopropyl alcohol  Procedure Details:   Injection:  Triamcinolone  Outcome: patient tolerated procedure well  Post-procedure details: sterile dressing applied and wound care instructions given  Dressing type: bandage   Comments:  A total of 0.6 ml of " 40 mg/mL Kenalog were injected into 6 lesion(s)  Lot #: [id number]  Expiration: 4/2026    Related Medications  triamcinolone acetonide (Kenalog-40) injection 24 mg      2. Acne vulgaris  Head - Anterior (Face)  Scattered comedones and inflammatory papulopustules.    Patient wishes to continue current regimen  -Refilled clindamycin lotion; apply once daily  -Refilled BP Cleanser 5%; use daily  -Refilled doxycycline, 100mg po BID    -Discussed/information given on the potential adverse effects of rx Doxycycline, including but not limited to, GI upset (nausea, vomiting, diarrhea), photosensitivity (and the need to wear SPF and avoid prolonged outdoor exposure), esophagitis (the patient is to take the medication with food & water and to remain upright for 1 hour after taking medication), and headaches.   -Doxycycline may may be taken with food to avoid GI upset; if taking with milk, multivitamins or antacids, the absorption of Doxycycline may be decreased but this is usually not an issue when treating common dermatologic conditions.  -Recommend the shortest appropriate course of oral antibiotics to reduce the chance of antibiotic resistance among bacteria.  -For patients of child-bearing potential, discussed that patient should not become pregnant while taking this medication as it can cause damage to the infant's teeth and other issues during pregnancy      Related Medications  benzoyl peroxide 5 % lotion  Apply to affected areas of the face once daily as tolerated. May bleach fabrics.    clindamycin (Cleocin T) 1 % lotion  Apply to the affected areas once daily.    doxycycline (Vibramycin) 100 mg capsule  Take one pill by mouth twice daily with food and at least 8 ounces (large glass) of water, do not lie down for 30 minutes after taking.    benzoyl peroxide (Benzac AC) 10 % external wash  Apply to affected areas on the back. Let sit for 2 minutes. Rinse thoroughly. Use daily as tolerated. May bleach  fabrics.        Follow up as needed.  Discussed if there are any changes or development of concerning symptoms (lesion/skin condition is changing, bleeding, enlarging, or worsening) the patient is to contact my office. The patient verbalizes understanding.    Enid Martinez MD  2/5/2025

## 2025-05-23 ENCOUNTER — APPOINTMENT (OUTPATIENT)
Dept: DERMATOLOGY | Facility: CLINIC | Age: 30
End: 2025-05-23
Payer: MEDICAID

## 2025-05-29 ENCOUNTER — APPOINTMENT (OUTPATIENT)
Dept: DERMATOLOGY | Facility: CLINIC | Age: 30
End: 2025-05-29
Payer: MEDICAID